# Patient Record
Sex: FEMALE | Race: WHITE | NOT HISPANIC OR LATINO | Employment: OTHER | URBAN - METROPOLITAN AREA
[De-identification: names, ages, dates, MRNs, and addresses within clinical notes are randomized per-mention and may not be internally consistent; named-entity substitution may affect disease eponyms.]

---

## 2021-07-28 ENCOUNTER — TREATMENT (OUTPATIENT)
Dept: PHYSICAL THERAPY | Facility: CLINIC | Age: 76
End: 2021-07-28

## 2021-07-28 DIAGNOSIS — M25.562 CHRONIC PAIN OF BOTH KNEES: Primary | ICD-10-CM

## 2021-07-28 DIAGNOSIS — M25.561 CHRONIC PAIN OF BOTH KNEES: Primary | ICD-10-CM

## 2021-07-28 DIAGNOSIS — G89.29 CHRONIC PAIN OF BOTH KNEES: Primary | ICD-10-CM

## 2021-07-28 PROCEDURE — 97162 PT EVAL MOD COMPLEX 30 MIN: CPT | Performed by: PHYSICAL THERAPIST

## 2021-07-28 PROCEDURE — 97110 THERAPEUTIC EXERCISES: CPT | Performed by: PHYSICAL THERAPIST

## 2021-07-28 NOTE — PROGRESS NOTES
Physical Therapy Initial Evaluation and Plan of Care      Patient: Lili Sam   : 1945  Diagnosis/ICD-10 Code:  The encounter diagnosis was Chronic pain of both knees.   Referring practitioner: Melida Edwards MD  Date of Initial Visit: Type: THERAPY  Noted: 2021  Today's Date: 2021  Patient seen for 1 sessions         Lili Sam reports:  B knee and thigh pain  Subjective Questionnaire: LEFS:     Subjective Evaluation    History of Present Illness  Onset date: about 2 years ago, worsening past 6 months.  Date of surgery: B TKA in .  Mechanism of injury: Pt reports chronic B knee and thigh pain for about the past 2 years, worsening in the past 6 months.  She notices that she is sitting more than she used to.  Sometimes it is difficult to get up after sitting.  She sometimes notices back pain when getting up after sitting.    Subjective comment: B knee and thigh pain  Patient Occupation: retired Pain  Current pain ratin  At best pain ratin  At worst pain ratin  Location: B knees and thighs  Quality: sharp (stabbing, breathtaking, sudden)  Alleviating factors: sitting (for short periods)  Aggravating factors: prolonged positioning and movement (getting up from sitting, movement after prolonged positioning)  Progression: worsening (worse past 6 months)    Social Support  Lives in: one-story house  Lives with: adult children    Hand dominance: right    Diagnostic Tests  Abnormal x-ray: prostheses intact.    Treatments  No previous or current treatments  Patient Goals  Patient goals for therapy: decreased pain, increased strength and increased motion           Treatment  Exercise 1  Exercise Name 1: Initial HEP education provided and practiced in clinic today         Functional Testing  Functional Tests Options: Lower Extremity Functional Index   Objective          Postural Observations  Seated posture: fair  Standing posture: fair    Additional Postural Observation  Details  Stands in slight hip flexion.    Neurological Testing     Sensation     Lumbar   Left   Intact: light touch    Right   Intact: light touch    Active Range of Motion     Lumbar   Normal active range of motion  Left Knee   Flexion: 110 degrees   Extension: 0 degrees     Right Knee   Flexion: 112 degrees   Extension: 0 degrees     Additional Active Range of Motion Details  B LE stretching with forward bend.  Low back stiffness with extension in standing.    Strength/Myotome Testing     Lumbar   Left   Heel walk: normal  Toe walk: normal    Right   Heel walk: normal  Toe walk: normal    Left Hip   Planes of Motion   Flexion: 4+  Extension: 3+  Abduction: 4    Right Hip   Planes of Motion   Flexion: 4+  Extension: 3+  Abduction: 4    Left Knee   Flexion: 5  Extension: 5    Right Knee   Flexion: 5  Extension: 5    Left Ankle/Foot   Dorsiflexion: 5  Plantar flexion: 5  Inversion: 5  Eversion: 5  Great toe extension: 5    Right Ankle/Foot   Dorsiflexion: 5  Plantar flexion: 5  Inversion: 5  Eversion: 5  Great toe extension: 5    Additional Strength Details  MMT WNL, but decreased functional strength.    Tests     Lumbar     Left   Negative passive SLR.     Right   Negative passive SLR.      General Comments     Lumbar Comments  Decreased hip rotation, decreased hip flexor and quad flexibility, decreased hamstring and calf flexibility bilaterally.         Assessment & Plan     Assessment  Impairments: abnormal gait, abnormal or restricted ROM, activity intolerance, impaired physical strength, lacks appropriate home exercise program and pain with function  Assessment details: Pt presents with B LE pain and decreased functional abilities worsening in the past 6 months.  She had B TKA in 2006.  Imaging has revealed no problems with prostheses.  She describes a generally sedentary lifestyle with frequent, prolonged sitting.  She demonstrates no focal strength deficits, and has reasonable good strength in knees and  ankles.  Hip strength is decreased particularly in extension, and hip mobility is limited, affecting functional abilities.  Pt will benefit from PT intervention to address pain and noted deficits in ROM, strength, and function.  Prognosis: good  Functional Limitations: walking, uncomfortable because of pain, sitting and standing  Goals  Plan Goals: 4 weeks  Pt. demonstrates independence and compliance with initial HEP.  Pt. reports reduction in pain intensity to no worse than 4/10 on NPRS.  AROM of B hips and knees shows improvement over baseline measures.  Functional outcome measure is improved by 10 points.    8 weeks  Pt. demonstrates independence in advanced HEP for ongoing improvement.  AROM of hips, knees, and lumbar spine  is sufficient for performance of daily activities.  LE strength is sufficient to allow participation in desired activities.  Functional outcome measure is improved to >45/80.          Plan  Therapy options: will be seen for skilled physical therapy services  Planned modality interventions: thermotherapy (hydrocollator packs) and cryotherapy  Planned therapy interventions: abdominal trunk stabilization, ADL retraining, balance/weight-bearing training, body mechanics training, flexibility, functional ROM exercises, gait training, home exercise program, joint mobilization, therapeutic activities, stretching, strengthening, soft tissue mobilization, postural training and manual therapy  Frequency: 2x week  Duration in visits: 16  Treatment plan discussed with: patient  Plan details: PT up to 2x/week per POC, addressing pain and noted deficits in ROM, strength, and function.        Timed:  Manual Therapy:         mins  64813;  Therapeutic Exercise:    10     mins  12308;     Neuromuscular Davonte:        mins  41114;    Therapeutic Activity:          mins  97491;     Gait Training:           mins  37322;     Ultrasound:          mins  43886;    Electrical Stimulation:         mins  66787 (  );    Untimed:  Electrical Stimulation:         mins  38990 ( );  Mechanical Traction:         mins  04376;     Timed Treatment:   10   mins   Total Treatment:     45   mins    PT SIGNATURE: Mary Nagy, MAGI   DATE TREATMENT INITIATED: 7/28/2021    Initial Certification  Certification Period: 10/26/2021  I certify that the therapy services are furnished while this patient is under my care.  The services outlined above are required by this patient, and will be reviewed every 90 days.     PHYSICIAN: Melida Edwards MD      DATE:     Please sign and return via fax to [phone].. Thank you, [facility].

## 2021-08-05 ENCOUNTER — TREATMENT (OUTPATIENT)
Dept: PHYSICAL THERAPY | Facility: CLINIC | Age: 76
End: 2021-08-05

## 2021-08-05 DIAGNOSIS — M25.561 CHRONIC PAIN OF BOTH KNEES: Primary | ICD-10-CM

## 2021-08-05 DIAGNOSIS — G89.29 CHRONIC PAIN OF BOTH KNEES: Primary | ICD-10-CM

## 2021-08-05 DIAGNOSIS — M25.562 CHRONIC PAIN OF BOTH KNEES: Primary | ICD-10-CM

## 2021-08-05 PROCEDURE — 97112 NEUROMUSCULAR REEDUCATION: CPT | Performed by: PHYSICAL THERAPIST

## 2021-08-05 PROCEDURE — 97110 THERAPEUTIC EXERCISES: CPT | Performed by: PHYSICAL THERAPIST

## 2021-08-05 NOTE — PROGRESS NOTES
Physical Therapy Daily Progress Note  VISIT: 2          Subjective    Lili Sam reports: mild discomfort upon arrival today.  She reports intermittent left buttock, thigh, and lateral calf pain.  She used to have pain in both legs.  Home exercises seem to be helping.  She says her bed is too soft for press ups, but other exercises are going well.      Pre-treatment pain:  0  Post-treatment pain:  0      Objective    Treatment    Exercise 1  Exercise Name 1: HEP review and progression  Exercise 2  Exercise Name 2: prone on elbows  Exercise 3  Exercise Name 3: prone knee flexion  Exercise 4  Exercise Name 4: prone hip extension  Exercise 5  Exercise Name 5: prone press ups  Exercise 6  Exercise Name 6: standing lumbar extension  Exercise 7  Exercise Name 7: side step at countertop  Exercise 8  Exercise Name 8: semi-tandem stance st countertop  Exercise 9  Exercise Name 9: split stance-floor/BOSU  Exercise 10  Exercise Name 10: alternating BOSU lunges  Exercise 11  Exercise Name 11: 1/2 foam roll step over-side and forward                 Assessment & Plan     Assessment  Assessment details: Gait deviation consistent with hip weakness (Trendelenberg).  Pt will benefit from PT intervention to progress functional strengthening while addressing intermittent pain.    Plan  Plan details: Continue PT.  Progress as tolerated.               Timed:  Manual Therapy:         mins  43251;  Therapeutic Exercise:    30     mins  36586;     Neuromuscular Davonte:    10    mins  94736;    Therapeutic Activity:          mins  12087;     Gait Training:           mins  86693;     Ultrasound:          mins  56607;    Electrical Stimulation:         mins  90204 ( );    Untimed:  Electrical Stimulation:         mins  37241 ( );  Mechanical Traction:         mins  84937;     Timed Treatment:   40   mins   Total Treatment:     40   mins      Mary Nagy, PT  Physical Therapist

## 2021-08-12 ENCOUNTER — TREATMENT (OUTPATIENT)
Dept: PHYSICAL THERAPY | Facility: CLINIC | Age: 76
End: 2021-08-12

## 2021-08-12 DIAGNOSIS — M25.561 CHRONIC PAIN OF BOTH KNEES: Primary | ICD-10-CM

## 2021-08-12 DIAGNOSIS — G89.29 CHRONIC PAIN OF BOTH KNEES: Primary | ICD-10-CM

## 2021-08-12 DIAGNOSIS — M25.562 CHRONIC PAIN OF BOTH KNEES: Primary | ICD-10-CM

## 2021-08-12 PROCEDURE — 97110 THERAPEUTIC EXERCISES: CPT | Performed by: PHYSICAL THERAPIST

## 2021-08-12 NOTE — PROGRESS NOTES
Physical Therapy Daily Progress Note  VISIT: 3          Subjective    Lili Sam reports: she awoke with muscle spasms this morning, but she has only mild discomfort upon arrival today.      Pre-treatment pain:  2  Post-treatment pain:  2      Objective    Treatment    Exercise 1  Exercise Name 1: Total Gym squats  Exercise 2  Exercise Name 2: sit to stand practice  Exercise 3  Exercise Name 3: heel raises  Exercise 4  Exercise Name 4: standing hip extension  Exercise 5  Exercise Name 5: sidelying hip abduction  Exercise 6  Exercise Name 6: bridging  Exercise 7  Exercise Name 7: hip adduction pillow squeeze  Exercise 8  Exercise Name 8: alternating BOSU lunges  Exercise 9  Exercise Name 9: advice regarding how to roll in bed more easily                 Assessment & Plan     Assessment  Assessment details: Pt indicates compliance with HEP.  Modifications made today were well tolerated in clinic.  Pt will benefit from ongoing PT intervention to improve strength and gait quality.    Plan  Plan details: Continue PT.               Timed:  Manual Therapy:         mins  63103;  Therapeutic Exercise:    40     mins  50479;     Neuromuscular Davonte:        mins  66902;    Therapeutic Activity:          mins  39860;     Gait Training:           mins  10762;     Ultrasound:          mins  14528;    Electrical Stimulation:         mins  89505 ( );    Untimed:  Electrical Stimulation:         mins  13391 ( );  Mechanical Traction:         mins  04298;     Timed Treatment:   40   mins   Total Treatment:     40   mins      Mary Nagy, PT  Physical Therapist

## 2021-08-19 ENCOUNTER — TREATMENT (OUTPATIENT)
Dept: PHYSICAL THERAPY | Facility: CLINIC | Age: 76
End: 2021-08-19

## 2021-08-19 DIAGNOSIS — M25.562 CHRONIC PAIN OF BOTH KNEES: Primary | ICD-10-CM

## 2021-08-19 DIAGNOSIS — G89.29 CHRONIC PAIN OF BOTH KNEES: Primary | ICD-10-CM

## 2021-08-19 DIAGNOSIS — M25.561 CHRONIC PAIN OF BOTH KNEES: Primary | ICD-10-CM

## 2021-08-19 PROCEDURE — 97110 THERAPEUTIC EXERCISES: CPT | Performed by: PHYSICAL THERAPIST

## 2021-08-19 NOTE — PROGRESS NOTES
"   Physical Therapy Daily Progress Note  VISIT: 4          Subjective    Lili Sam reports: she saw her doctor.  He told her she has tendinitis in her left hip.  He will see her again in one year.      Pre-treatment pain:  7  Post-treatment pain:  5      Objective    Treatment    Exercise 1  Exercise Name 1: Nu-Step  Equipment/Resistance 1: level 6  Time: 6'  Exercise 2  Exercise Name 2: sit to stand practice  Exercise 3  Exercise Name 3: heel raises  Exercise 4  Exercise Name 4: side stepping  Exercise 5  Exercise Name 5: 8\" alternating step taps  Exercise 6  Exercise Name 6: standing march (L difficult)  Exercise 7  Exercise Name 7: 8\" straddle step/weight shifting  Exercise 8  Exercise Name 8: prone quad stretch with strap  Exercise 9  Exercise Name 9: prone active knee flexion  Exercise 10  Exercise Name 10: prone hip extension  Exercise 11  Exercise Name 11: hooklying clamshell  Equipment/Resistance 11: green band  Exercise 12  Exercise Name 12: hooklying ball squeeze hip adduction  Exercise 13  Exercise Name 13: bridging (had cramping, could not continue)  Exercise 14  Exercise Name 14: rotation with LE's on ball  Exercise 15  Exercise Name 15: hip/knee flexion with LE's on ball                 Assessment & Plan     Assessment  Assessment details: Pt fatigues quickly with most exercises.  Sit to stand transfer is improving, appears less difficult.  Pt will benefit from ongoing PT intervention to progress strengthening as tolerated.    Plan  Plan details: Continue PT.  Progress or modify exercises based on symptom response.               Timed:  Manual Therapy:         mins  93840;  Therapeutic Exercise:    45     mins  54862;     Neuromuscular Davonte:        mins  41123;    Therapeutic Activity:          mins  91641;     Gait Training:           mins  56574;     Ultrasound:          mins  71485;    Electrical Stimulation:         mins  63318 ( );    Untimed:  Electrical Stimulation:         mins  03124 " ( );  Mechanical Traction:         mins  78482;     Timed Treatment:   45   mins   Total Treatment:     45   mins      Mary Nagy PT  Physical Therapist

## 2021-08-26 ENCOUNTER — TREATMENT (OUTPATIENT)
Dept: PHYSICAL THERAPY | Facility: CLINIC | Age: 76
End: 2021-08-26

## 2021-08-26 DIAGNOSIS — G89.29 CHRONIC PAIN OF BOTH KNEES: Primary | ICD-10-CM

## 2021-08-26 DIAGNOSIS — M25.562 CHRONIC PAIN OF BOTH KNEES: Primary | ICD-10-CM

## 2021-08-26 DIAGNOSIS — M25.561 CHRONIC PAIN OF BOTH KNEES: Primary | ICD-10-CM

## 2021-08-26 PROCEDURE — 97110 THERAPEUTIC EXERCISES: CPT | Performed by: PHYSICAL THERAPIST

## 2021-08-26 NOTE — PROGRESS NOTES
Physical Therapy Daily Progress Note  VISIT: 5          Subjective    Lili Sam reports: she has had a busy week, helping her grandson with start of school year.  She has been in the car much more than usual.  She describes left sided low back pain and left leg pain to her foot.  She feels this is different from her typical knee joint discomfort.      Pre-treatment pain:  7  Post-treatment pain:  3      Objective    Treatment    Exercise 1  Exercise Name 1: prone lying  Exercise 2  Exercise Name 2: prone knee flexion  Exercise 3  Exercise Name 3: prone hip extension  Exercise 4  Exercise Name 4: standing lumbar extension  Exercise 5  Exercise Name 5: L side glide in standing  Exercise 6  Exercise Name 6: problem solving regarding getting in/out of tub safely         Ice  Ice Applied: Yes  Location: L knee, lumbar spine  PT Ice Rx Minutes: 10  Ice S/P Rx: Yes  Patient reports will apply ice at home to involved area: Yes  Other Treatment Provided  Ice Applied: Yes  Patient reports will apply ice at home to involved area: Yes       Assessment & Plan     Assessment  Assessment details: Left low back and leg pain not present when lying prone or when doing exercises in prone.  Symptoms did not immediately return when coming back to upright posture.  Pt has many deficits in flexibility and strength which affect her overall mobility, however, her description of recently increased symptoms is consistent with pain of lumbar origin.    Plan  Plan details: Continue PT.  Progress functional mobility and strengthening as tolerated, while also addressing pain which has recently increased.               Timed:  Manual Therapy:         mins  44961;  Therapeutic Exercise:    30     mins  97976;     Neuromuscular Davonte:        mins  57310;    Therapeutic Activity:          mins  10415;     Gait Training:           mins  91677;     Ultrasound:          mins  61055;    Electrical Stimulation:         mins  83357 (  );    Untimed:  Electrical Stimulation:         mins  01347 ( );  Mechanical Traction:         mins  15055;     Timed Treatment:   30   mins   Total Treatment:     40   mins      Mary Nagy PT  Physical Therapist

## 2021-09-22 ENCOUNTER — DOCUMENTATION (OUTPATIENT)
Dept: PHYSICAL THERAPY | Facility: CLINIC | Age: 76
End: 2021-09-22

## 2021-09-22 NOTE — PROGRESS NOTES
Discharge Summary  Discharge Summary from Physical Therapy Report      Patient: Lili Sam   : 1945  Diagnosis/ICD-10 Code:  There were no encounter diagnoses.   Referring practitioner: No ref. provider found  Date of Initial Visit: No linked episodes  Today's Date: 2021  Date of Last Visit: 2021     Number of Visits: 5  Discharge Status of Patient: Pt cancelled remaining visits due to other health concerns.    Goals: Not Met    Discharge Plan: Continue with current home exercise program as instructed    Comments:  Pt did not complete treatment.    Date of Discharge: 2021        Mary Nagy, PT